# Patient Record
Sex: MALE | Race: BLACK OR AFRICAN AMERICAN | Employment: FULL TIME | URBAN - METROPOLITAN AREA
[De-identification: names, ages, dates, MRNs, and addresses within clinical notes are randomized per-mention and may not be internally consistent; named-entity substitution may affect disease eponyms.]

---

## 2020-11-08 ENCOUNTER — HOSPITAL ENCOUNTER (EMERGENCY)
Age: 20
Discharge: HOME | End: 2020-11-08
Attending: EMERGENCY MEDICINE

## 2020-11-08 VITALS
SYSTOLIC BLOOD PRESSURE: 118 MMHG | HEART RATE: 78 BPM | DIASTOLIC BLOOD PRESSURE: 75 MMHG | OXYGEN SATURATION: 98 % | BODY MASS INDEX: 24.38 KG/M2 | HEIGHT: 74 IN | WEIGHT: 190 LBS | TEMPERATURE: 98.5 F | RESPIRATION RATE: 16 BRPM

## 2020-11-08 DIAGNOSIS — K11.8 SALIVARY DUCT OBSTRUCTION: Primary | ICD-10-CM

## 2020-11-08 NOTE — ED PROVIDER NOTES
EMERGENCY DEPARTMENT HISTORY AND PHYSICAL EXAM      Date: 11/8/2020  Patient Name: Yoli Longo    History of Presenting Illness     Chief Complaint   Patient presents with    Allergic Reaction       History (Context): Yoli Longo is a 23 y.o. previously healthy gentleman who presents with several hours of subacute onset, progressive, mild to moderate, localized right jaw swelling without clear exacerbating/relieving features or other associated symptoms. It is not particularly painful. On review of systems, the patient denies fever, chills, rashes, mouth pain    PCP: None    Current Outpatient Medications   Medication Sig Dispense Refill    ibuprofen (MOTRIN) 600 mg tablet Take 1 Tab by mouth every six (6) hours as needed for Pain. 20 Tab 0       Past History     Past Medical History:  No past medical history on file. Past Surgical History:  No past surgical history on file. Family History:  Family History   Problem Relation Age of Onset    Hypertension Mother     Hypertension Father     Other Father         Heart murmur       Social History:  Social History     Tobacco Use    Smoking status: Current Some Day Smoker     Types: Cigars    Tobacco comment: black and milds   Substance Use Topics    Alcohol use: Yes     Comment: Weekends. 4-5 drinks per day    Drug use: Yes     Frequency: 2.0 times per week     Types: Marijuana       Allergies:  No Known Allergies    PMH, PSH, family history, social history, allergies reviewed with the patient with significant items noted above. Review of Systems   As per HPI, otherwise reviewed and negative.      Physical Exam     Vitals:    11/08/20 0409   BP: 118/75   Pulse: 78   Resp: 16   Temp: 98.5 °F (36.9 °C)   SpO2: 98%   Weight: 86.2 kg (190 lb)   Height: 6' 2\" (1.88 m)       Gen: Well-appearing, in no acute distress   HEENT: Normocephalic, sclera anicteric, distention of salivary gland, no clear salivary stone  Cardiovascular: Normal rate, regular rhythm, no murmurs, rubs, gallops. Pulses intact and equal distally. Pulmonary: No respiratory distress. No stridor. Clear lungs. ABD: Soft, nontender, nondistended. Neuro: Alert. Normal speech. Normal mentation. Psych: Normal thought content and thought processes. : No CVA tenderness  EXT: Moves all extremities well. No cyanosis or clubbing. Skin: Warm and well-perfused. Other:        Diagnostic Study Results     Labs -   No results found for this or any previous visit (from the past 12 hour(s)). Radiologic Studies -   No orders to display     CT Results  (Last 48 hours)    None        CXR Results  (Last 48 hours)    None            Medical Decision Making   I am the first provider for this patient. I reviewed the vital signs, available nursing notes, past medical history, past surgical history, family history and social history. Vital Signs-Reviewed the patient's vital signs. Records Reviewed: Personally, on initial evaluation    MDM:   Patient presents with salivary gland swelling without clear evidence of sialolithiasis. DDX considered: Salivary duct obstruction,   DDX thought to be less likely but also considered due to high risk condition: dental infection, trauma, infected parotitis    Patient condition on initial evaluation: Stable    Plan:   Patient will go home with hot compresses and directions to drink warm liquids prior to utilizing sour candy to help move saliva along. Patient demonstrated understanding. If this not helpful, the patient will follow up with otolaryngology for reevaluation.       Follow-up Information     Follow up With Specialties Details Why [address] [facility] EMERGENCY DEPT Emergency Medicine  As needed, If symptoms worsen [address] [place] [address]    Carmen Brar MD Otolaryngology, Surgery Schedule an appointment as soon as possible for a visit As needed, If symptoms worsen [place] [facility]  [phone]            Current Discharge Medication List          Procedures:  Procedures      Critical Care Time:     Disposition: Discharge    Diagnosis     Clinical Impression:   1. Salivary duct obstruction        Signed,  Charlie Sauceda MD  Emergency Physician  [facility]    As a voice dictation software was utilized to dictate this note, minor word transpositions can occur. I apologize for confusing wording and typographic errors. Please feel free to contact me for clarification.

## 2020-11-08 NOTE — ED NOTES
I have reviewed discharge instructions with the patient. The patient verbalized understanding. Pt ambulated to lobby. No sings of distress.

## 2020-11-08 NOTE — DISCHARGE INSTRUCTIONS
At home, used warm compresses frequently and drink warm beverages. Just after using a warm compress and warm beverage, eat something very sour. This should help get the saliva moving again. If this is not help you over the next couple of days, please see otolaryngology (ENT), Dr. Phillip Rainey. Please call his office to make an appointment.

## 2020-11-08 NOTE — ED TRIAGE NOTES
Patient presents to the ED with complaints of allergic reaction after eating wings from [employer]. Patient's right jaw slightly swollen. Patient denies any pain.